# Patient Record
Sex: FEMALE | Race: WHITE | URBAN - METROPOLITAN AREA
[De-identification: names, ages, dates, MRNs, and addresses within clinical notes are randomized per-mention and may not be internally consistent; named-entity substitution may affect disease eponyms.]

---

## 2020-01-13 ENCOUNTER — APPOINTMENT (RX ONLY)
Dept: URBAN - METROPOLITAN AREA CLINIC 16 | Facility: CLINIC | Age: 30
Setting detail: DERMATOLOGY
End: 2020-01-13

## 2020-01-13 DIAGNOSIS — T69.1XX: ICD-10-CM

## 2020-01-13 PROBLEM — L30.9 DERMATITIS, UNSPECIFIED: Status: ACTIVE | Noted: 2020-01-13

## 2020-01-13 PROCEDURE — ? BIOPSY BY PUNCH METHOD

## 2020-01-13 PROCEDURE — ? DIAGNOSIS COMMENT

## 2020-01-13 PROCEDURE — 11104 PUNCH BX SKIN SINGLE LESION: CPT

## 2020-01-13 PROCEDURE — ? COUNSELING

## 2020-01-13 ASSESSMENT — LOCATION DETAILED DESCRIPTION DERM: LOCATION DETAILED: LEFT PROXIMAL DORSAL RING FINGER

## 2020-01-13 ASSESSMENT — LOCATION ZONE DERM: LOCATION ZONE: FINGER

## 2020-01-13 ASSESSMENT — LOCATION SIMPLE DESCRIPTION DERM: LOCATION SIMPLE: LEFT RING FINGER

## 2020-01-13 NOTE — PROCEDURE: BIOPSY BY PUNCH METHOD
Epidermal Sutures: 4-0 Nylon
Detail Level: Simple
Biopsy Type: H and E
Home Suture Removal Text: Patient was provided a home suture removal kit and will remove their sutures at home.  If they have any questions or difficulties they will call the office.
X Size Of Lesion In Cm (Optional): 0
Wound Care: Petrolatum
Bill For Surgical Tray: no
Consent: Written consent was obtained and risks were reviewed including but not limited to scarring, infection, bleeding, scabbing, incomplete removal, nerve damage and allergy to anesthesia.
Punch Size In Mm: 4
Anesthesia Type: 2% lidocaine with epinephrine
Notification Instructions: Patient will be notified of biopsy results. However, patient instructed to call the office if not contacted within 2 weeks.
Hemostasis: Ligature
Billing Type: Third-Party Bill
Dressing: bandage
Lab: 11
Suture Removal: 10 days
Body Location Override (Optional - Billing Will Still Be Based On Selected Body Map Location If Applicable): left index finger
Anesthesia Volume In Cc (Will Not Render If 0): 2
Was A Bandage Applied: Yes
Post-Care Instructions: I reviewed with the patient in detail post-care instructions. Patient is to keep the biopsy site dry overnight, and then apply bacitracin twice daily until healed. Patient may apply hydrogen peroxide soaks to remove any crusting.

## 2020-01-13 NOTE — PROCEDURE: DIAGNOSIS COMMENT
Detail Level: Detailed
Comment: ROS negative (denies mouth ulcers, joint pain, myalgias, fevers, weight loss, night sweats)\\n\\nFavor perniosis/Chilblains > lupus pernio > sarcoidosis\\n\\nPunch biopsy obtained today, consider lab workup pending results\\nConsider starting nifedipine

## 2020-01-22 ENCOUNTER — RX ONLY (OUTPATIENT)
Age: 30
Setting detail: RX ONLY
End: 2020-01-22

## 2020-01-22 RX ORDER — CLOBETASOL PROPIONATE 0.5 MG/G
OINTMENT TOPICAL
Qty: 1 | Refills: 0 | Status: ERX | COMMUNITY
Start: 2020-01-22

## 2020-01-24 ENCOUNTER — APPOINTMENT (RX ONLY)
Dept: URBAN - METROPOLITAN AREA CLINIC 16 | Facility: CLINIC | Age: 30
Setting detail: DERMATOLOGY
End: 2020-01-24

## 2020-01-24 DIAGNOSIS — T69.1XX: ICD-10-CM

## 2020-01-24 PROBLEM — T69.1XXA CHILBLAINS, INITIAL ENCOUNTER: Status: ACTIVE | Noted: 2020-01-24

## 2020-01-24 PROCEDURE — ? ORDER TESTS

## 2020-01-24 PROCEDURE — ? TREATMENT REGIMEN

## 2020-01-24 PROCEDURE — 99213 OFFICE O/P EST LOW 20 MIN: CPT

## 2020-01-24 PROCEDURE — ? DIAGNOSIS COMMENT

## 2020-01-24 PROCEDURE — ? COUNSELING

## 2020-01-24 ASSESSMENT — LOCATION SIMPLE DESCRIPTION DERM
LOCATION SIMPLE: LEFT MIDDLE FINGER
LOCATION SIMPLE: RIGHT INDEX FINGER
LOCATION SIMPLE: RIGHT MIDDLE FINGER

## 2020-01-24 ASSESSMENT — LOCATION DETAILED DESCRIPTION DERM
LOCATION DETAILED: RIGHT PROXIMAL DORSAL INDEX FINGER
LOCATION DETAILED: RIGHT PROXIMAL DORSAL MIDDLE FINGER
LOCATION DETAILED: LEFT PROXIMAL DORSAL MIDDLE FINGER

## 2020-01-24 ASSESSMENT — LOCATION ZONE DERM: LOCATION ZONE: FINGER

## 2020-01-24 NOTE — PROCEDURE: DIAGNOSIS COMMENT
Comment: Biopsy consistent with pernio. Will check blood work today but ROS negative.\\n\\nDoing well on topical steroids\\nConsider compounding nifedipine 4% from [facility] if no improvement vs. topical sildebafil cream
Detail Level: Zone

## 2020-01-24 NOTE — PROCEDURE: TREATMENT REGIMEN
Initiate Treatment: Clobetasol 0.05% ointment BID for flares\\nKeep hands as warm as possible, prevent fluctuations in cold temperatures
Detail Level: Simple